# Patient Record
Sex: FEMALE | Race: WHITE
[De-identification: names, ages, dates, MRNs, and addresses within clinical notes are randomized per-mention and may not be internally consistent; named-entity substitution may affect disease eponyms.]

---

## 2024-05-15 ENCOUNTER — DASHBOARD ENCOUNTERS (OUTPATIENT)
Age: 64
End: 2024-05-15

## 2024-05-15 ENCOUNTER — OFFICE VISIT (OUTPATIENT)
Dept: URBAN - NONMETROPOLITAN AREA CLINIC 4 | Facility: CLINIC | Age: 64
End: 2024-05-15
Payer: COMMERCIAL

## 2024-05-15 ENCOUNTER — LAB OUTSIDE AN ENCOUNTER (OUTPATIENT)
Dept: URBAN - NONMETROPOLITAN AREA CLINIC 4 | Facility: CLINIC | Age: 64
End: 2024-05-15

## 2024-05-15 VITALS
HEART RATE: 80 BPM | TEMPERATURE: 98.2 F | WEIGHT: 137.2 LBS | SYSTOLIC BLOOD PRESSURE: 111 MMHG | HEIGHT: 70 IN | BODY MASS INDEX: 19.64 KG/M2 | DIASTOLIC BLOOD PRESSURE: 72 MMHG

## 2024-05-15 DIAGNOSIS — M32.9 LUPUS: ICD-10-CM

## 2024-05-15 DIAGNOSIS — R10.30 LOWER ABDOMINAL PAIN: ICD-10-CM

## 2024-05-15 DIAGNOSIS — R79.89 ABNORMAL LFTS: ICD-10-CM

## 2024-05-15 DIAGNOSIS — Z12.11 ENCOUNTER FOR SCREENING COLONOSCOPY: ICD-10-CM

## 2024-05-15 DIAGNOSIS — F10.10 ALCOHOL ABUSE: ICD-10-CM

## 2024-05-15 DIAGNOSIS — Z72.0 TOBACCO ABUSE: ICD-10-CM

## 2024-05-15 PROBLEM — 200936003: Status: ACTIVE | Noted: 2024-05-15

## 2024-05-15 PROBLEM — 15167005: Status: ACTIVE | Noted: 2024-05-15

## 2024-05-15 PROCEDURE — 99204 OFFICE O/P NEW MOD 45 MIN: CPT | Performed by: INTERNAL MEDICINE

## 2024-05-15 RX ORDER — VARENICLINE 0.5 MG/1
1 TABLET AFTER EATING WITH A FULL GLASS OF WATER TABLET, FILM COATED ORAL ONCE A DAY
Status: ACTIVE | COMMUNITY

## 2024-05-15 RX ORDER — FAMOTIDINE 40 MG/1
1 TABLET AT BEDTIME TABLET, FILM COATED ORAL ONCE A DAY
Status: ACTIVE | COMMUNITY

## 2024-05-15 RX ORDER — HYDROXYCHLOROQUINE SULFATE 200 MG/1
AS DIRECTED TABLET, FILM COATED ORAL
Status: ACTIVE | COMMUNITY

## 2024-05-15 RX ORDER — PREDNISONE 10 MG/1
1 TABLET TABLET ORAL ONCE A DAY
Status: ACTIVE | COMMUNITY

## 2024-05-22 LAB
ABSOLUTE BASOPHILS: 17
ABSOLUTE EOSINOPHILS: 69
ABSOLUTE LYMPHOCYTES: 1041
ABSOLUTE MONOCYTES: 714
ABSOLUTE NEUTROPHILS: 6760
ACTIN (SMOOTH MUSCLE) ANTIBODY (IGG): 20
ALBUMIN/GLOBULIN RATIO: 0.9
ALBUMIN: 3.6
ALKALINE PHOSPHATASE: 269
ALT: 165
ANA SCREEN, IFA: POSITIVE
AST: 69
BASOPHILS: 0.2
BILIRUBIN, TOTAL: 0.6
BUN/CREATININE RATIO: (no result)
CALCIUM: 9
CARBON DIOXIDE: 29
CHLORIDE: 98
CREATININE: 0.83
EGFR: 80
EOSINOPHILS: 0.8
FIB 4 INDEX: 1.5
FIB 4 INTERPRETATION: (no result)
GLOBULIN: 3.8
GLUCOSE: 82
HEMATOCRIT: 44.3
HEMOGLOBIN: 15.4
IMMUNOGLOBULIN A: 113
IMMUNOGLOBULIN G: 2522
IMMUNOGLOBULIN M: 46
INR: 1
LYMPHOCYTES: 12.1
MCH: 31.4
MCHC: 34.8
MCV: 90.2
MITOCHONDRIAL (M2) ANTIBODY: <=20
MONOCYTES: 8.3
MPV: 9.3
NEUTROPHILS: 78.6
PLATELET COUNT: 222
PLATELET COUNT: 222
POTASSIUM: 4.7
PROTEIN, TOTAL: 7.4
PT: 10.4
RDW: 13.8
RED BLOOD CELL COUNT: 4.91
SODIUM: 135
UREA NITROGEN (BUN): 21
WHITE BLOOD CELL COUNT: 8.6

## 2024-06-04 ENCOUNTER — TELEPHONE ENCOUNTER (OUTPATIENT)
Dept: URBAN - METROPOLITAN AREA CLINIC 23 | Facility: CLINIC | Age: 64
End: 2024-06-04

## 2024-06-06 ENCOUNTER — TELEPHONE ENCOUNTER (OUTPATIENT)
Dept: URBAN - NONMETROPOLITAN AREA CLINIC 4 | Facility: CLINIC | Age: 64
End: 2024-06-06

## 2024-06-20 ENCOUNTER — TELEPHONE ENCOUNTER (OUTPATIENT)
Dept: URBAN - METROPOLITAN AREA CLINIC 54 | Facility: CLINIC | Age: 64
End: 2024-06-20

## 2024-06-20 ENCOUNTER — TELEPHONE ENCOUNTER (OUTPATIENT)
Dept: RURAL CLINIC 2 | Facility: CLINIC | Age: 64
End: 2024-06-20

## 2024-06-21 ENCOUNTER — OFFICE VISIT (OUTPATIENT)
Dept: URBAN - METROPOLITAN AREA CLINIC 54 | Facility: CLINIC | Age: 64
End: 2024-06-21
Payer: COMMERCIAL

## 2024-06-21 VITALS
SYSTOLIC BLOOD PRESSURE: 91 MMHG | TEMPERATURE: 97.3 F | BODY MASS INDEX: 20.19 KG/M2 | HEIGHT: 70 IN | WEIGHT: 141 LBS | HEART RATE: 93 BPM | DIASTOLIC BLOOD PRESSURE: 65 MMHG

## 2024-06-21 DIAGNOSIS — F10.10 ALCOHOL ABUSE: ICD-10-CM

## 2024-06-21 DIAGNOSIS — R10.30 LOWER ABDOMINAL PAIN: ICD-10-CM

## 2024-06-21 DIAGNOSIS — R42 LIGHTHEADEDNESS: ICD-10-CM

## 2024-06-21 DIAGNOSIS — K70.9 ALD (ALCOHOLIC LIVER DISEASE): ICD-10-CM

## 2024-06-21 PROCEDURE — 99214 OFFICE O/P EST MOD 30 MIN: CPT

## 2024-06-21 RX ORDER — FAMOTIDINE 40 MG/1
1 TABLET AT BEDTIME TABLET, FILM COATED ORAL ONCE A DAY
Status: ACTIVE | COMMUNITY

## 2024-06-21 RX ORDER — VARENICLINE 0.5 MG/1
1 TABLET AFTER EATING WITH A FULL GLASS OF WATER TABLET, FILM COATED ORAL ONCE A DAY
Status: ACTIVE | COMMUNITY

## 2024-06-21 RX ORDER — DOXYCYCLINE HYCLATE 100 MG/1
1 CAPSULE CAPSULE, GELATIN COATED ORAL ONCE A DAY
Status: ACTIVE | COMMUNITY

## 2024-06-21 RX ORDER — HYDROXYCHLOROQUINE SULFATE 200 MG/1
AS DIRECTED TABLET, FILM COATED ORAL
Status: ACTIVE | COMMUNITY

## 2024-06-21 NOTE — PHYSICAL EXAM SKIN:
no rashes , no jaundice present , good turgor , no masses , no tenderness on palpation , calcinosis on forearms. Tanned/hyperpigmented skin.

## 2024-06-21 NOTE — PHYSICAL EXAM GASTROINTESTINAL
Abdomen , soft, nontender, mild distention, stretch marks, no guarding or rigidity , no masses palpable , normal bowel sounds , Liver and Spleen , no hepatomegaly present , no hepatosplenomegaly , liver nontender , spleen not palpable

## 2024-06-21 NOTE — HPI-TODAY'S VISIT:
5/15/24: Patient is a 64 yo woman referred by Abimbola Ladd NP for above reasons. A copy of this document will be sent to referring provider. She is referred for abdominal pain and abnormal LFTs. She was admitted at [facility] in April 2024 for evaluation of chest pain. Her cardiac workup was unremarkable and she was diagnosed with pneumonia. She represented last week for syncope and hypotension managed with fluid resusitation. She has been diagnosed with lupus by Dr. Xiao. Her LFTs have been abnormal over past two admissions. Her transaminases have ranged from  and AP < 300 with normal TB. She is a chornic smoker. She consumes around 24 beers per week x 20 years. She denies signs/symptoms of CLD. No family history of liver disease. She has been asked to cut down on aclohol. The pain is LLQ, intermittent and can last several minutes. No aggravating or relieving factors. No constipation, diarrhea or rectal bleeding. She denies nausea, vomiting. She has normal appetite. She is being weaned off prednisone and switched to Plaqenil. USG x 3 in 2024 shows a normal appearing liver. PLT counts are normal. Hep B/C markers negative. SORAIDA noated at 1:1280. Her sister had malignant hyperthermia.  6/21/24 Follow up: Pt returns today to review labs, liver bx, and upocming colonoscopy. Liver enzymes remain elevated with normal synthetic fxn: AST 69, , , TB 0.6, albumin 3.6, Plt 222, INR 1.1 Normal kidney fxn Cr 0.83, GFR 80 and lytes. SORAIDA+, ASMA+ at 20, AMA-, IgG elevated at 2522. Liver biopsy showed mild amount steatosis and fibrosis, F2; no cirrhosis or autoimmune hepatitis. Had CT yesterday but report is not complete. Pt states she feels terrible. She is very weak and dizzy, with low BP. No diarrhea or vomiting. Drinking lots of plain water, no ORS like pedialyte. Pt has stopped drinking etoh. Unsure if she should keep colonoscopy as scheduled on Monday. Abd pain has improved.

## 2024-06-25 ENCOUNTER — OFFICE VISIT (OUTPATIENT)
Dept: URBAN - METROPOLITAN AREA SURGERY CENTER 14 | Facility: SURGERY CENTER | Age: 64
End: 2024-06-25

## 2024-07-08 ENCOUNTER — OFFICE VISIT (OUTPATIENT)
Dept: URBAN - NONMETROPOLITAN AREA CLINIC 4 | Facility: CLINIC | Age: 64
End: 2024-07-08

## 2024-07-08 RX ORDER — FAMOTIDINE 40 MG/1
1 TABLET AT BEDTIME TABLET, FILM COATED ORAL ONCE A DAY
Status: ACTIVE | COMMUNITY

## 2024-07-08 RX ORDER — VARENICLINE 0.5 MG/1
1 TABLET AFTER EATING WITH A FULL GLASS OF WATER TABLET, FILM COATED ORAL ONCE A DAY
Status: ACTIVE | COMMUNITY

## 2024-07-08 RX ORDER — HYDROXYCHLOROQUINE SULFATE 200 MG/1
AS DIRECTED TABLET, FILM COATED ORAL
Status: ACTIVE | COMMUNITY

## 2024-07-08 RX ORDER — DOXYCYCLINE HYCLATE 100 MG/1
1 CAPSULE CAPSULE, GELATIN COATED ORAL ONCE A DAY
Status: ACTIVE | COMMUNITY

## 2024-07-10 ENCOUNTER — TELEPHONE ENCOUNTER (OUTPATIENT)
Dept: URBAN - METROPOLITAN AREA CLINIC 54 | Facility: CLINIC | Age: 64
End: 2024-07-10

## 2024-07-16 ENCOUNTER — OFFICE VISIT (OUTPATIENT)
Dept: URBAN - NONMETROPOLITAN AREA CLINIC 4 | Facility: CLINIC | Age: 64
End: 2024-07-16

## 2024-08-08 ENCOUNTER — TELEPHONE ENCOUNTER (OUTPATIENT)
Dept: URBAN - METROPOLITAN AREA CLINIC 54 | Facility: CLINIC | Age: 64
End: 2024-08-08

## 2024-12-26 ENCOUNTER — OFFICE VISIT (OUTPATIENT)
Dept: URBAN - METROPOLITAN AREA CLINIC 54 | Facility: CLINIC | Age: 64
End: 2024-12-26
Payer: COMMERCIAL

## 2024-12-26 VITALS
TEMPERATURE: 98.4 F | BODY MASS INDEX: 20.1 KG/M2 | DIASTOLIC BLOOD PRESSURE: 72 MMHG | HEIGHT: 70 IN | SYSTOLIC BLOOD PRESSURE: 126 MMHG | WEIGHT: 140.4 LBS | HEART RATE: 89 BPM

## 2024-12-26 DIAGNOSIS — M32.9 LUPUS: ICD-10-CM

## 2024-12-26 DIAGNOSIS — F10.10 ALCOHOL ABUSE: ICD-10-CM

## 2024-12-26 DIAGNOSIS — R79.89 ABNORMAL LFTS: ICD-10-CM

## 2024-12-26 DIAGNOSIS — R10.30 LOWER ABDOMINAL PAIN: ICD-10-CM

## 2024-12-26 DIAGNOSIS — R42 LIGHTHEADEDNESS: ICD-10-CM

## 2024-12-26 DIAGNOSIS — K70.9 ALD (ALCOHOLIC LIVER DISEASE): ICD-10-CM

## 2024-12-26 DIAGNOSIS — Z72.0 TOBACCO ABUSE: ICD-10-CM

## 2024-12-26 DIAGNOSIS — Z12.11 ENCOUNTER FOR SCREENING COLONOSCOPY: ICD-10-CM

## 2024-12-26 PROCEDURE — 99214 OFFICE O/P EST MOD 30 MIN: CPT | Performed by: PHYSICIAN ASSISTANT

## 2024-12-26 RX ORDER — HYDROXYCHLOROQUINE SULFATE 200 MG/1
AS DIRECTED TABLET, FILM COATED ORAL
Status: ACTIVE | COMMUNITY

## 2024-12-26 NOTE — HPI-TODAY'S VISIT:
5/15/24: Patient is a 62 yo woman referred by Abimbola Ladd NP for above reasons. A copy of this document will be sent to referring provider. She is referred for abdominal pain and abnormal LFTs. She was admitted at [facility] in April 2024 for evaluation of chest pain. Her cardiac workup was unremarkable and she was diagnosed with pneumonia. She represented last week for syncope and hypotension managed with fluid resusitation. She has been diagnosed with lupus by Dr. Xiao. Her LFTs have been abnormal over past two admissions. Her transaminases have ranged from  and AP < 300 with normal TB. She is a chornic smoker. She consumes around 24 beers per week x 20 years. She denies signs/symptoms of CLD. No family history of liver disease. She has been asked to cut down on aclohol. The pain is LLQ, intermittent and can last several minutes. No aggravating or relieving factors. No constipation, diarrhea or rectal bleeding. She denies nausea, vomiting. She has normal appetite. She is being weaned off prednisone and switched to Plaqenil. USG x 3 in 2024 shows a normal appearing liver. PLT counts are normal. Hep B/C markers negative. SORAIDA noated at 1:1280. Her sister had malignant hyperthermia.  6/21/24 Follow up: Pt returns today to review labs, liver bx, and upocming colonoscopy. Liver enzymes remain elevated with normal synthetic fxn: AST 69, , , TB 0.6, albumin 3.6, Plt 222, INR 1.1 Normal kidney fxn Cr 0.83, GFR 80 and lytes. SORAIDA+, ASMA+ at 20, AMA-, IgG elevated at 2522. Liver biopsy showed mild amount steatosis and fibrosis, F2; no cirrhosis or autoimmune hepatitis. Had CT yesterday but report is not complete. Pt states she feels terrible. She is very weak and dizzy, with low BP. No diarrhea or vomiting. Drinking lots of plain water, no ORS like pedialyte. Pt has stopped drinking etoh. Unsure if she should keep colonoscopy as scheduled on Monday. Abd pain has improved.  12/26/24 Follow Up: Pt was referred back to Dr Bowden by Dr Xiao for elevated LFTs. Recent labs on 12/12 showed AST 58, ALT 71, alk phos at 525.  Additional labs showed normocytic anemia with hemoglobin 11.8 and low complement C4. Her current daily medications only consist of hydroxychloroquine and ASA 81 mg.  On 12/12 she had CXR that showed recurrent pneumonia.  She started Augmentin x 5 days and another antibiotic she cannot remember.  She is now discontinued those.  Patient states her  has had complicated pneumonia requiring prolonged hospitalization x 1 month and remains on a trach in LTAC.  He is improving recently, but this is because increased rest and patient admits to restarting intermittent alcohol use.  Last drink was last night with 22 ounce beer. CT from 6/2024 was unremarkable.

## 2024-12-27 LAB
A/G RATIO: 1.1
ABSOLUTE BASOPHILS: 30
ABSOLUTE EOSINOPHILS: 40
ABSOLUTE LYMPHOCYTES: 908
ABSOLUTE MONOCYTES: 393
ABSOLUTE NEUTROPHILS: 1931
ALBUMIN: 3.8
ALKALINE PHOSPHATASE: 85
ALT (SGPT): 7
AST (SGOT): 14
BASOPHILS: 0.9
BILIRUBIN, TOTAL: 0.6
BUN/CREATININE RATIO: (no result)
BUN: 12
CALCIUM: 9.2
CARBON DIOXIDE, TOTAL: 30
CHLORIDE: 103
CREATININE: 0.67
EGFR: 98
EOSINOPHILS: 1.2
GGT: 27
GLOBULIN, TOTAL: 3.4
GLUCOSE: 87
HEMATOCRIT: 42.8
HEMOGLOBIN: 14.1
INR: 1.1
LYMPHOCYTES: 27.5
MCH: 30.5
MCHC: 32.9
MCV: 92.6
MONOCYTES: 11.9
MPV: 9.6
NEUTROPHILS: 58.5
PLATELET COUNT: 179
POTASSIUM: 4.2
PROTEIN, TOTAL: 7.2
PT: 11.3
RDW: 13.7
RED BLOOD CELL COUNT: 4.62
SODIUM: 141
WHITE BLOOD CELL COUNT: 3.3

## 2025-03-24 ENCOUNTER — OFFICE VISIT (OUTPATIENT)
Dept: URBAN - METROPOLITAN AREA CLINIC 54 | Facility: CLINIC | Age: 65
End: 2025-03-24